# Patient Record
Sex: MALE | Race: WHITE | Employment: OTHER | URBAN - METROPOLITAN AREA
[De-identification: names, ages, dates, MRNs, and addresses within clinical notes are randomized per-mention and may not be internally consistent; named-entity substitution may affect disease eponyms.]

---

## 2024-10-30 ENCOUNTER — TRANSCRIBE ORDERS (OUTPATIENT)
Dept: SCHEDULING | Age: 59
End: 2024-10-30

## 2024-10-30 ENCOUNTER — HOSPITAL ENCOUNTER (OUTPATIENT)
Dept: ULTRASOUND IMAGING | Age: 59
Discharge: HOME | End: 2024-11-02
Attending: INTERNAL MEDICINE
Payer: MEDICAID

## 2024-10-30 ENCOUNTER — HOSPITAL ENCOUNTER (EMERGENCY)
Age: 59
Discharge: HOME | End: 2024-10-30
Attending: GENERAL PRACTICE
Payer: MEDICAID

## 2024-10-30 ENCOUNTER — APPOINTMENT (OUTPATIENT)
Dept: CT IMAGING | Age: 59
End: 2024-10-30
Payer: MEDICAID

## 2024-10-30 VITALS
OXYGEN SATURATION: 99 % | DIASTOLIC BLOOD PRESSURE: 72 MMHG | WEIGHT: 170 LBS | TEMPERATURE: 98.3 F | RESPIRATION RATE: 19 BRPM | HEART RATE: 45 BPM | SYSTOLIC BLOOD PRESSURE: 165 MMHG

## 2024-10-30 DIAGNOSIS — H35.82 RETINAL ISCHEMIA: Primary | ICD-10-CM

## 2024-10-30 DIAGNOSIS — H35.82 RETINAL ISCHEMIA: ICD-10-CM

## 2024-10-30 DIAGNOSIS — R93.89 ABNORMAL ULTRASOUND: Primary | ICD-10-CM

## 2024-10-30 LAB
ALBUMIN SERPL-MCNC: 3.6 G/DL (ref 3.2–4.6)
ALBUMIN/GLOB SERPL: 0.9 (ref 1–1.9)
ALP SERPL-CCNC: 62 U/L (ref 40–129)
ALT SERPL-CCNC: 15 U/L (ref 8–55)
ANION GAP SERPL CALC-SCNC: 10 MMOL/L (ref 7–16)
AST SERPL-CCNC: 21 U/L (ref 15–37)
BASOPHILS # BLD: 0.1 K/UL (ref 0–0.2)
BASOPHILS NFR BLD: 1 % (ref 0–2)
BILIRUB SERPL-MCNC: 0.3 MG/DL (ref 0–1.2)
BUN SERPL-MCNC: 14 MG/DL (ref 8–23)
CALCIUM SERPL-MCNC: 9.6 MG/DL (ref 8.8–10.2)
CHLORIDE SERPL-SCNC: 100 MMOL/L (ref 98–107)
CO2 SERPL-SCNC: 26 MMOL/L (ref 20–29)
CREAT SERPL-MCNC: 1.22 MG/DL (ref 0.8–1.3)
DIFFERENTIAL METHOD BLD: ABNORMAL
EKG ATRIAL RATE: 44 BPM
EKG DIAGNOSIS: NORMAL
EKG P AXIS: 58 DEGREES
EKG P-R INTERVAL: 162 MS
EKG Q-T INTERVAL: 489 MS
EKG QRS DURATION: 111 MS
EKG QTC CALCULATION (BAZETT): 419 MS
EKG R AXIS: -27 DEGREES
EKG T AXIS: 72 DEGREES
EKG VENTRICULAR RATE: 44 BPM
EOSINOPHIL # BLD: 0.2 K/UL (ref 0–0.8)
EOSINOPHIL NFR BLD: 2 % (ref 0.5–7.8)
ERYTHROCYTE [DISTWIDTH] IN BLOOD BY AUTOMATED COUNT: 12.6 % (ref 11.9–14.6)
GLOBULIN SER CALC-MCNC: 4.1 G/DL (ref 2.3–3.5)
GLUCOSE SERPL-MCNC: 95 MG/DL (ref 70–99)
HCT VFR BLD AUTO: 42 % (ref 41.1–50.3)
HGB BLD-MCNC: 14 G/DL (ref 13.6–17.2)
IMM GRANULOCYTES # BLD AUTO: 0 K/UL (ref 0–0.5)
IMM GRANULOCYTES NFR BLD AUTO: 0 % (ref 0–5)
INR PPP: 1.3
LYMPHOCYTES # BLD: 3.5 K/UL (ref 0.5–4.6)
LYMPHOCYTES NFR BLD: 33 % (ref 13–44)
MCH RBC QN AUTO: 30.9 PG (ref 26.1–32.9)
MCHC RBC AUTO-ENTMCNC: 33.3 G/DL (ref 31.4–35)
MCV RBC AUTO: 92.7 FL (ref 82–102)
MONOCYTES # BLD: 0.5 K/UL (ref 0.1–1.3)
MONOCYTES NFR BLD: 5 % (ref 4–12)
NEUTS SEG # BLD: 6.1 K/UL (ref 1.7–8.2)
NEUTS SEG NFR BLD: 59 % (ref 43–78)
NRBC # BLD: 0 K/UL (ref 0–0.2)
PLATELET # BLD AUTO: 270 K/UL (ref 150–450)
PMV BLD AUTO: 8.8 FL (ref 9.4–12.3)
POTASSIUM SERPL-SCNC: 4.4 MMOL/L (ref 3.5–5.1)
PROT SERPL-MCNC: 7.8 G/DL (ref 6.3–8.2)
PROTHROMBIN TIME: 16.5 SEC (ref 11.3–14.9)
RBC # BLD AUTO: 4.53 M/UL (ref 4.23–5.6)
SODIUM SERPL-SCNC: 136 MMOL/L (ref 136–145)
WBC # BLD AUTO: 10.3 K/UL (ref 4.3–11.1)

## 2024-10-30 PROCEDURE — 93880 EXTRACRANIAL BILAT STUDY: CPT

## 2024-10-30 PROCEDURE — 80053 COMPREHEN METABOLIC PANEL: CPT

## 2024-10-30 PROCEDURE — 93005 ELECTROCARDIOGRAM TRACING: CPT | Performed by: GENERAL PRACTICE

## 2024-10-30 PROCEDURE — 85025 COMPLETE CBC W/AUTO DIFF WBC: CPT

## 2024-10-30 PROCEDURE — 6360000002 HC RX W HCPCS: Performed by: GENERAL PRACTICE

## 2024-10-30 PROCEDURE — 6360000004 HC RX CONTRAST MEDICATION: Performed by: GENERAL PRACTICE

## 2024-10-30 PROCEDURE — 93010 ELECTROCARDIOGRAM REPORT: CPT | Performed by: INTERNAL MEDICINE

## 2024-10-30 PROCEDURE — 85610 PROTHROMBIN TIME: CPT

## 2024-10-30 PROCEDURE — 99285 EMERGENCY DEPT VISIT HI MDM: CPT

## 2024-10-30 PROCEDURE — 96374 THER/PROPH/DIAG INJ IV PUSH: CPT

## 2024-10-30 PROCEDURE — 70498 CT ANGIOGRAPHY NECK: CPT

## 2024-10-30 PROCEDURE — 93880 EXTRACRANIAL BILAT STUDY: CPT | Performed by: RADIOLOGY

## 2024-10-30 RX ORDER — ONDANSETRON 2 MG/ML
4 INJECTION INTRAMUSCULAR; INTRAVENOUS
Status: COMPLETED | OUTPATIENT
Start: 2024-10-30 | End: 2024-10-30

## 2024-10-30 RX ORDER — IOPAMIDOL 755 MG/ML
60 INJECTION, SOLUTION INTRAVASCULAR
Status: COMPLETED | OUTPATIENT
Start: 2024-10-30 | End: 2024-10-30

## 2024-10-30 RX ADMIN — ONDANSETRON 4 MG: 2 INJECTION INTRAMUSCULAR; INTRAVENOUS at 16:49

## 2024-10-30 RX ADMIN — IOPAMIDOL 60 ML: 755 INJECTION, SOLUTION INTRAVENOUS at 16:10

## 2024-10-30 ASSESSMENT — PAIN - FUNCTIONAL ASSESSMENT: PAIN_FUNCTIONAL_ASSESSMENT: NONE - DENIES PAIN

## 2024-10-30 ASSESSMENT — LIFESTYLE VARIABLES
HOW MANY STANDARD DRINKS CONTAINING ALCOHOL DO YOU HAVE ON A TYPICAL DAY: PATIENT DOES NOT DRINK
HOW OFTEN DO YOU HAVE A DRINK CONTAINING ALCOHOL: NEVER

## 2024-10-30 NOTE — ED PROVIDER NOTES
person, place, and time.      Cranial Nerves: No cranial nerve deficit.      Sensory: No sensory deficit.      Motor: No weakness.      Coordination: Coordination normal.   Psychiatric:         Mood and Affect: Mood normal.        Procedures     Procedures    Orders Placed This Encounter   Procedures    CTA HEAD NECK W WO CONTRAST    CBC with Auto Differential    CMP    Protime-INR    EKG 12 Lead        Medications given during this emergency department visit:  Medications   iopamidol (ISOVUE-370) 76 % injection 60 mL (60 mLs IntraVENous Given 10/30/24 1610)   ondansetron (ZOFRAN) injection 4 mg (4 mg IntraVENous Given 10/30/24 1649)       New Prescriptions    No medications on file        No past medical history on file.     No past surgical history on file.     Social History     Socioeconomic History    Marital status:         Previous Medications    No medications on file        Results for orders placed or performed during the hospital encounter of 10/30/24   CTA HEAD NECK W WO CONTRAST    Addendum: 10/30/2024    Addendum: Manual search of the sonographic evaluation. No CT evidence of  dissection. If patient is symptomatic then intravascular ultrasonographic  evaluation could be obtained and/or a short-term (24-hour) repeat follow-up CT  could be obtained.    Electronically signed by Mani Booker      Narrative    EXAMINATION: CTA HEAD NECK W WO CONTRAST 10/30/2024 4:13 PM    ACCESSION NUMBER: [id number]    COMPARISON: None available    INDICATION: L carotid abnormal US    TECHNIQUE: Dedicated contrast enhanced CT of the arteries of the head and neck  was performed with axial images following a timed vascular bolus of 100 mL of  Isovue-370.  Coronal and sagittal reformats are provided.  3-D reconstructions  are provided.    Radiation dose reduction techniques were used for this study. Our CT scanners  use one or all of the following: Automated exposure control, adjustment of the  mA and/or kV  according to patient size, iterative reconstruction.      FINDINGS:    NECK VASCULATURE:    Aortic Arch: Unremarkable  Proximal Right Subclavian Artery: Unremarkable  Proximal Left Subclavian Artery: Unremarkable    Right Common Carotid Artery: Unremarkable  Right Carotid Bifurcation: Mild atheromatous disease  Right Internal Carotid Artery, Cervical Segment: Unremarkable    Left Common Carotid Artery: Unremarkable  Left Carotid Bifurcation: Mild atheromatous disease without significant stenosis  or flow-limiting dissection or thrombotic occlusion (intraluminal artifact in  both internal carotid arteries is noted as can be artifactually noted).  Left Internal Carotid Artery, Cervical Segment: Unremarkable    Right Vertebral Artery, Cervical Segment: Unremarkable  Left Vertebral Artery, Cervical Segment: Unremarkable    INTRACRANIAL VASCULATURE:    Right Internal Carotid Artery: Unremarkable  Right Middle Cerebral Artery: Unremarkable    Left Internal Carotid Artery: Unremarkable  Left middle cerebral artery: Unremarkable    Right Anterior Cerebral Artery: Unremarkable  Left Anterior Cerebral Artery: Unremarkable  Anterior Communicating Artery: Unremarkable    Right V4 Segment: Unremarkable  Left V4 Segment: Unremarkable    Basilar Artery: Unremarkable    Right Superior Cerebellar Artery: Unremarkable  Left Superior Cerebellar Artery: Unremarkable    Right Posterior Cerebral Artery: Unremarkable  Left Posterior Cerebral Artery: Unremarkable    Dural Venous Sinuses: Grossly unremarkable.    MISCELLANEOUS FINDINGS:    Lung Apices: Moderate paraseptal and centrilobular emphysema.    Neck Soft Tissues: Unremarkable    Orbital Soft Tissues: Unremarkable visualized portions of the orbits.    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 Mild atheromatous carotid bifurcation left greater than right without  definitive flow-limiting dissection or occlusion or thrombosis

## 2024-10-30 NOTE — ED NOTES
Patient mobility status  with no difficulty. Provider aware     I have reviewed discharge instructions with the patient.  The patient verbalized understanding.    Patient left ED via Discharge Method: ambulatory to Home with  self .    Opportunity for questions and clarification provided.     Patient given 0 scripts.            Dia Canchola, RN  10/30/24 3842